# Patient Record
Sex: MALE | Race: BLACK OR AFRICAN AMERICAN | Employment: OTHER | URBAN - METROPOLITAN AREA
[De-identification: names, ages, dates, MRNs, and addresses within clinical notes are randomized per-mention and may not be internally consistent; named-entity substitution may affect disease eponyms.]

---

## 2017-05-03 ENCOUNTER — APPOINTMENT (OUTPATIENT)
Dept: GENERAL RADIOLOGY | Age: 67
End: 2017-05-03
Attending: PHYSICIAN ASSISTANT
Payer: MEDICARE

## 2017-05-03 ENCOUNTER — HOSPITAL ENCOUNTER (OUTPATIENT)
Age: 67
Discharge: HOME | End: 2017-05-03
Payer: MEDICARE

## 2017-05-03 VITALS
RESPIRATION RATE: 20 BRPM | DIASTOLIC BLOOD PRESSURE: 82 MMHG | OXYGEN SATURATION: 97 % | WEIGHT: 205 LBS | HEART RATE: 77 BPM | HEIGHT: 65 IN | TEMPERATURE: 98 F | BODY MASS INDEX: 34.16 KG/M2 | SYSTOLIC BLOOD PRESSURE: 136 MMHG

## 2017-05-03 DIAGNOSIS — M62.838 SPASM OF MUSCLE: ICD-10-CM

## 2017-05-03 DIAGNOSIS — M54.40 BACK PAIN OF LUMBAR REGION WITH SCIATICA: Primary | ICD-10-CM

## 2017-05-03 PROCEDURE — 99214 OFFICE O/P EST MOD 30 MIN: CPT

## 2017-05-03 PROCEDURE — 96372 THER/PROPH/DIAG INJ SC/IM: CPT

## 2017-05-03 PROCEDURE — 81002 URINALYSIS NONAUTO W/O SCOPE: CPT | Performed by: PHYSICIAN ASSISTANT

## 2017-05-03 PROCEDURE — 73502 X-RAY EXAM HIP UNI 2-3 VIEWS: CPT

## 2017-05-03 PROCEDURE — 72110 X-RAY EXAM L-2 SPINE 4/>VWS: CPT

## 2017-05-03 RX ORDER — DEXAMETHASONE SODIUM PHOSPHATE 4 MG/ML
10 VIAL (ML) INJECTION ONCE
Status: COMPLETED | OUTPATIENT
Start: 2017-05-03 | End: 2017-05-03

## 2017-05-03 RX ORDER — TIZANIDINE 4 MG/1
4 TABLET ORAL NIGHTLY
Qty: 30 TABLET | Refills: 0 | Status: SHIPPED | OUTPATIENT
Start: 2017-05-03 | End: 2017-12-23

## 2017-05-03 RX ORDER — KETOROLAC TROMETHAMINE 30 MG/ML
30 INJECTION, SOLUTION INTRAMUSCULAR; INTRAVENOUS ONCE
Status: COMPLETED | OUTPATIENT
Start: 2017-05-03 | End: 2017-05-03

## 2017-05-03 RX ORDER — METHYLPREDNISOLONE 4 MG/1
TABLET ORAL
Qty: 1 PACKAGE | Refills: 0 | Status: ON HOLD | OUTPATIENT
Start: 2017-05-03 | End: 2017-12-22

## 2017-05-03 RX ORDER — DEXAMETHASONE SODIUM PHOSPHATE 4 MG/ML
10 VIAL (ML) INJECTION ONCE
Status: DISCONTINUED | OUTPATIENT
Start: 2017-05-03 | End: 2017-05-03

## 2017-05-03 NOTE — ED INITIAL ASSESSMENT (HPI)
C/O lower back pain for couple months and the past 3 days getting worse. Was seen by PCP and was given muscle relaxant. Denies known injury. Denies urinary symptoms.

## 2017-05-03 NOTE — ED PROVIDER NOTES
Patient Seen in: [address]    History   Patient presents with:  Back Pain    Stated Complaint: back pain x 3 days    HPI    77 yo male here with c/o LBP , L>R for the past several months worse in the past 3 days.  PT reports love Positive for stated complaint: back pain x 3 days  Other systems are as noted in HPI. Constitutional and vital signs reviewed. All other systems reviewed and negative except as noted above.     PSFH elements reviewed from today and agreed except as ot 5/3/2017  PROCEDURE:  XR LUMBAR SPINE (MIN 4 VIEWS) (CPT=72110)  TECHNIQUE:  AP, lateral, oblique, and coned down L5-S1 views were obtained. COMPARISON:  None.   INDICATIONS:  back pain x 3 days  PATIENT STATED HISTORY: (As transcribed by Technologist)  Palomo Henriquez Back pain of lumbar region with sciatica  (primary encounter diagnosis)  Spasm of muscle    Disposition:  Discharge    Follow-up:  [facility]  [address] [facility] [address] [phone]  Schedule an appointment as soon as po

## 2017-12-17 ENCOUNTER — APPOINTMENT (OUTPATIENT)
Dept: CT IMAGING | Facility: HOSPITAL | Age: 67
End: 2017-12-17
Attending: EMERGENCY MEDICINE
Payer: MEDICARE

## 2017-12-17 ENCOUNTER — HOSPITAL ENCOUNTER (EMERGENCY)
Facility: HOSPITAL | Age: 67
Discharge: HOME | End: 2017-12-17
Attending: EMERGENCY MEDICINE
Payer: MEDICARE

## 2017-12-17 VITALS
TEMPERATURE: 98 F | RESPIRATION RATE: 17 BRPM | HEART RATE: 86 BPM | BODY MASS INDEX: 32.49 KG/M2 | WEIGHT: 195 LBS | SYSTOLIC BLOOD PRESSURE: 141 MMHG | OXYGEN SATURATION: 97 % | DIASTOLIC BLOOD PRESSURE: 86 MMHG | HEIGHT: 65 IN

## 2017-12-17 DIAGNOSIS — N20.0 KIDNEY STONE: Primary | ICD-10-CM

## 2017-12-17 PROCEDURE — 99285 EMERGENCY DEPT VISIT HI MDM: CPT

## 2017-12-17 PROCEDURE — 96376 TX/PRO/DX INJ SAME DRUG ADON: CPT

## 2017-12-17 PROCEDURE — 81003 URINALYSIS AUTO W/O SCOPE: CPT | Performed by: EMERGENCY MEDICINE

## 2017-12-17 PROCEDURE — 96374 THER/PROPH/DIAG INJ IV PUSH: CPT

## 2017-12-17 PROCEDURE — 85025 COMPLETE CBC W/AUTO DIFF WBC: CPT | Performed by: EMERGENCY MEDICINE

## 2017-12-17 PROCEDURE — 74176 CT ABD & PELVIS W/O CONTRAST: CPT | Performed by: EMERGENCY MEDICINE

## 2017-12-17 PROCEDURE — 96361 HYDRATE IV INFUSION ADD-ON: CPT

## 2017-12-17 PROCEDURE — 96375 TX/PRO/DX INJ NEW DRUG ADDON: CPT

## 2017-12-17 PROCEDURE — 99284 EMERGENCY DEPT VISIT MOD MDM: CPT

## 2017-12-17 PROCEDURE — 80048 BASIC METABOLIC PNL TOTAL CA: CPT | Performed by: EMERGENCY MEDICINE

## 2017-12-17 RX ORDER — KETOROLAC TROMETHAMINE 30 MG/ML
INJECTION, SOLUTION INTRAMUSCULAR; INTRAVENOUS
Status: DISCONTINUED
Start: 2017-12-17 | End: 2017-12-17

## 2017-12-17 RX ORDER — ONDANSETRON 8 MG/1
8 TABLET, ORALLY DISINTEGRATING ORAL EVERY 4 HOURS PRN
Qty: 10 TABLET | Refills: 0 | Status: ON HOLD | OUTPATIENT
Start: 2017-12-17 | End: 2017-12-22

## 2017-12-17 RX ORDER — HYDROCODONE BITARTRATE AND ACETAMINOPHEN 5; 325 MG/1; MG/1
1-2 TABLET ORAL EVERY 4 HOURS PRN
Qty: 20 TABLET | Refills: 0 | Status: SHIPPED | OUTPATIENT
Start: 2017-12-17 | End: 2017-12-23

## 2017-12-17 RX ORDER — KETOROLAC TROMETHAMINE 30 MG/ML
30 INJECTION, SOLUTION INTRAMUSCULAR; INTRAVENOUS ONCE
Status: COMPLETED | OUTPATIENT
Start: 2017-12-17 | End: 2017-12-17

## 2017-12-17 RX ORDER — HYDROMORPHONE HYDROCHLORIDE 1 MG/ML
0.5 INJECTION, SOLUTION INTRAMUSCULAR; INTRAVENOUS; SUBCUTANEOUS ONCE
Status: COMPLETED | OUTPATIENT
Start: 2017-12-17 | End: 2017-12-17

## 2017-12-17 RX ORDER — HYDROMORPHONE HYDROCHLORIDE 1 MG/ML
1 INJECTION, SOLUTION INTRAMUSCULAR; INTRAVENOUS; SUBCUTANEOUS ONCE
Status: COMPLETED | OUTPATIENT
Start: 2017-12-17 | End: 2017-12-17

## 2017-12-17 RX ORDER — ONDANSETRON 2 MG/ML
4 INJECTION INTRAMUSCULAR; INTRAVENOUS ONCE
Status: COMPLETED | OUTPATIENT
Start: 2017-12-17 | End: 2017-12-17

## 2017-12-17 RX ORDER — IBUPROFEN 600 MG/1
600 TABLET ORAL EVERY 8 HOURS PRN
Qty: 30 TABLET | Refills: 0 | Status: SHIPPED | OUTPATIENT
Start: 2017-12-17 | End: 2017-12-23

## 2017-12-17 NOTE — ED PROVIDER NOTES
Patient Seen in: [facility] Emergency Department    History   Patient presents with:  Abdomen/Flank Pain (GI/)    Stated Complaint:     HPI    52-year-old male who developed flank pain at about 3 AM last night. He has never had this before.   He s nonfocal   Skin: no rashes or nodules         ED Course     Labs Reviewed   BASIC METABOLIC PANEL (8) - Abnormal; Notable for the following:        Result Value    Glucose 117 (*)     Creatinine 1.65 (*)     GFR 49 (*)     All other components within allen days          Medications Prescribed:  Discharge Medication List as of 12/17/2017 10:34 AM    START taking these medications    HYDROcodone-acetaminophen 5-325 MG Oral Tab  Take 1-2 tablets by mouth every 4 (four) hours as needed for Pain., Print Script, D

## 2017-12-22 ENCOUNTER — SURGERY (OUTPATIENT)
Age: 67
End: 2017-12-22

## 2017-12-22 ENCOUNTER — APPOINTMENT (OUTPATIENT)
Dept: GENERAL RADIOLOGY | Facility: HOSPITAL | Age: 67
DRG: 418 | End: 2017-12-22
Attending: COLON & RECTAL SURGERY
Payer: MEDICARE

## 2017-12-22 ENCOUNTER — APPOINTMENT (OUTPATIENT)
Dept: CT IMAGING | Facility: HOSPITAL | Age: 67
DRG: 418 | End: 2017-12-22
Attending: EMERGENCY MEDICINE
Payer: MEDICARE

## 2017-12-22 ENCOUNTER — HOSPITAL ENCOUNTER (INPATIENT)
Facility: HOSPITAL | Age: 67
LOS: 1 days | Discharge: HOME | DRG: 418 | End: 2017-12-23
Attending: EMERGENCY MEDICINE | Admitting: HOSPITALIST
Payer: MEDICARE

## 2017-12-22 ENCOUNTER — ANESTHESIA EVENT (OUTPATIENT)
Dept: SURGERY | Facility: HOSPITAL | Age: 67
DRG: 418 | End: 2017-12-22
Payer: MEDICARE

## 2017-12-22 ENCOUNTER — ANESTHESIA (OUTPATIENT)
Dept: SURGERY | Facility: HOSPITAL | Age: 67
DRG: 418 | End: 2017-12-22
Payer: MEDICARE

## 2017-12-22 DIAGNOSIS — K80.10 CHOLECYSTITIS WITH CHOLELITHIASIS: ICD-10-CM

## 2017-12-22 DIAGNOSIS — K81.0 ACUTE CHOLECYSTITIS: Primary | ICD-10-CM

## 2017-12-22 PROCEDURE — 47563 LAPARO CHOLECYSTECTOMY/GRAPH: CPT | Performed by: COLON & RECTAL SURGERY

## 2017-12-22 PROCEDURE — BF10YZZ FLUOROSCOPY OF BILE DUCTS USING OTHER CONTRAST: ICD-10-PCS | Performed by: COLON & RECTAL SURGERY

## 2017-12-22 PROCEDURE — 74177 CT ABD & PELVIS W/CONTRAST: CPT | Performed by: EMERGENCY MEDICINE

## 2017-12-22 PROCEDURE — 99223 1ST HOSP IP/OBS HIGH 75: CPT | Performed by: COLON & RECTAL SURGERY

## 2017-12-22 PROCEDURE — 99223 1ST HOSP IP/OBS HIGH 75: CPT | Performed by: HOSPITALIST

## 2017-12-22 PROCEDURE — 0FT44ZZ RESECTION OF GALLBLADDER, PERCUTANEOUS ENDOSCOPIC APPROACH: ICD-10-PCS | Performed by: COLON & RECTAL SURGERY

## 2017-12-22 PROCEDURE — 76000 FLUOROSCOPY <1 HR PHYS/QHP: CPT | Performed by: COLON & RECTAL SURGERY

## 2017-12-22 RX ORDER — DIPHENHYDRAMINE HYDROCHLORIDE 50 MG/ML
12.5 INJECTION INTRAMUSCULAR; INTRAVENOUS AS NEEDED
Status: DISCONTINUED | OUTPATIENT
Start: 2017-12-22 | End: 2017-12-22 | Stop reason: HOSPADM

## 2017-12-22 RX ORDER — ASPIRIN 81 MG/1
81 TABLET ORAL DAILY
Status: DISCONTINUED | OUTPATIENT
Start: 2017-12-22 | End: 2017-12-22

## 2017-12-22 RX ORDER — ATORVASTATIN CALCIUM 20 MG/1
20 TABLET, FILM COATED ORAL NIGHTLY
Status: DISCONTINUED | OUTPATIENT
Start: 2017-12-22 | End: 2017-12-22

## 2017-12-22 RX ORDER — KETOROLAC TROMETHAMINE 30 MG/ML
15 INJECTION, SOLUTION INTRAMUSCULAR; INTRAVENOUS ONCE
Status: COMPLETED | OUTPATIENT
Start: 2017-12-22 | End: 2017-12-22

## 2017-12-22 RX ORDER — HYDROMORPHONE HYDROCHLORIDE 1 MG/ML
0.5 INJECTION, SOLUTION INTRAMUSCULAR; INTRAVENOUS; SUBCUTANEOUS EVERY 30 MIN PRN
Status: DISCONTINUED | OUTPATIENT
Start: 2017-12-22 | End: 2017-12-23

## 2017-12-22 RX ORDER — ALBUTEROL SULFATE 90 UG/1
2 AEROSOL, METERED RESPIRATORY (INHALATION) EVERY 6 HOURS PRN
Status: DISCONTINUED | OUTPATIENT
Start: 2017-12-22 | End: 2017-12-23

## 2017-12-22 RX ORDER — ATENOLOL 25 MG/1
25 TABLET ORAL DAILY
Status: DISCONTINUED | OUTPATIENT
Start: 2017-12-22 | End: 2017-12-22

## 2017-12-22 RX ORDER — NALTREXONE HYDROCHLORIDE 50 MG/1
50 TABLET, FILM COATED ORAL NIGHTLY
COMMUNITY

## 2017-12-22 RX ORDER — POLYETHYLENE GLYCOL 3350 17 G/17G
17 POWDER, FOR SOLUTION ORAL DAILY PRN
Status: DISCONTINUED | OUTPATIENT
Start: 2017-12-22 | End: 2017-12-23

## 2017-12-22 RX ORDER — ONDANSETRON 2 MG/ML
4 INJECTION INTRAMUSCULAR; INTRAVENOUS ONCE
Status: COMPLETED | OUTPATIENT
Start: 2017-12-22 | End: 2017-12-22

## 2017-12-22 RX ORDER — MORPHINE SULFATE 2 MG/ML
1 INJECTION, SOLUTION INTRAMUSCULAR; INTRAVENOUS EVERY 2 HOUR PRN
Status: DISCONTINUED | OUTPATIENT
Start: 2017-12-22 | End: 2017-12-23

## 2017-12-22 RX ORDER — TRAMADOL HYDROCHLORIDE 50 MG/1
50 TABLET ORAL
Qty: 30 TABLET | Refills: 0 | Status: SHIPPED | OUTPATIENT
Start: 2017-12-22

## 2017-12-22 RX ORDER — ACETAMINOPHEN 10 MG/ML
INJECTION, SOLUTION INTRAVENOUS
Status: DISCONTINUED | OUTPATIENT
Start: 2017-12-22 | End: 2017-12-22 | Stop reason: HOSPADM

## 2017-12-22 RX ORDER — AMLODIPINE BESYLATE 5 MG/1
10 TABLET ORAL DAILY
Status: DISCONTINUED | OUTPATIENT
Start: 2017-12-23 | End: 2017-12-23

## 2017-12-22 RX ORDER — AMLODIPINE BESYLATE 5 MG/1
10 TABLET ORAL DAILY
Status: DISCONTINUED | OUTPATIENT
Start: 2017-12-23 | End: 2017-12-22

## 2017-12-22 RX ORDER — ONDANSETRON 2 MG/ML
4 INJECTION INTRAMUSCULAR; INTRAVENOUS EVERY 6 HOURS PRN
Status: DISCONTINUED | OUTPATIENT
Start: 2017-12-22 | End: 2017-12-23

## 2017-12-22 RX ORDER — MORPHINE SULFATE 4 MG/ML
4 INJECTION, SOLUTION INTRAMUSCULAR; INTRAVENOUS EVERY 2 HOUR PRN
Status: DISCONTINUED | OUTPATIENT
Start: 2017-12-22 | End: 2017-12-23

## 2017-12-22 RX ORDER — BUPIVACAINE HYDROCHLORIDE AND EPINEPHRINE 2.5; 5 MG/ML; UG/ML
INJECTION, SOLUTION EPIDURAL; INFILTRATION; INTRACAUDAL; PERINEURAL AS NEEDED
Status: DISCONTINUED | OUTPATIENT
Start: 2017-12-22 | End: 2017-12-22 | Stop reason: HOSPADM

## 2017-12-22 RX ORDER — TERAZOSIN 10 MG/1
10 CAPSULE ORAL NIGHTLY
COMMUNITY

## 2017-12-22 RX ORDER — SODIUM CHLORIDE 9 MG/ML
INJECTION, SOLUTION INTRAVENOUS CONTINUOUS
Status: DISCONTINUED | OUTPATIENT
Start: 2017-12-22 | End: 2017-12-23

## 2017-12-22 RX ORDER — MEPERIDINE HYDROCHLORIDE 25 MG/ML
12.5 INJECTION INTRAMUSCULAR; INTRAVENOUS; SUBCUTANEOUS AS NEEDED
Status: DISCONTINUED | OUTPATIENT
Start: 2017-12-22 | End: 2017-12-22 | Stop reason: HOSPADM

## 2017-12-22 RX ORDER — TERAZOSIN 10 MG/1
10 CAPSULE ORAL NIGHTLY
Status: DISCONTINUED | OUTPATIENT
Start: 2017-12-22 | End: 2017-12-23

## 2017-12-22 RX ORDER — SODIUM CHLORIDE, SODIUM LACTATE, POTASSIUM CHLORIDE, CALCIUM CHLORIDE 600; 310; 30; 20 MG/100ML; MG/100ML; MG/100ML; MG/100ML
INJECTION, SOLUTION INTRAVENOUS CONTINUOUS
Status: DISCONTINUED | OUTPATIENT
Start: 2017-12-22 | End: 2017-12-22 | Stop reason: HOSPADM

## 2017-12-22 RX ORDER — HYDROMORPHONE HYDROCHLORIDE 1 MG/ML
INJECTION, SOLUTION INTRAMUSCULAR; INTRAVENOUS; SUBCUTANEOUS
Status: COMPLETED
Start: 2017-12-22 | End: 2017-12-22

## 2017-12-22 RX ORDER — CARISOPRODOL 350 MG/1
350 TABLET ORAL 3 TIMES DAILY PRN
COMMUNITY

## 2017-12-22 RX ORDER — HYDROCODONE BITARTRATE AND ACETAMINOPHEN 5; 325 MG/1; MG/1
1 TABLET ORAL AS NEEDED
Status: DISCONTINUED | OUTPATIENT
Start: 2017-12-22 | End: 2017-12-22

## 2017-12-22 RX ORDER — MAGNESIUM HYDROXIDE 1200 MG/15ML
LIQUID ORAL CONTINUOUS PRN
Status: DISCONTINUED | OUTPATIENT
Start: 2017-12-22 | End: 2017-12-22

## 2017-12-22 RX ORDER — TRAMADOL HYDROCHLORIDE 50 MG/1
50 TABLET ORAL EVERY 6 HOURS PRN
Status: DISCONTINUED | OUTPATIENT
Start: 2017-12-22 | End: 2017-12-23

## 2017-12-22 RX ORDER — ONDANSETRON 2 MG/ML
4 INJECTION INTRAMUSCULAR; INTRAVENOUS AS NEEDED
Status: DISCONTINUED | OUTPATIENT
Start: 2017-12-22 | End: 2017-12-22 | Stop reason: HOSPADM

## 2017-12-22 RX ORDER — NALOXONE HYDROCHLORIDE 0.4 MG/ML
80 INJECTION, SOLUTION INTRAMUSCULAR; INTRAVENOUS; SUBCUTANEOUS AS NEEDED
Status: DISCONTINUED | OUTPATIENT
Start: 2017-12-22 | End: 2017-12-22 | Stop reason: HOSPADM

## 2017-12-22 RX ORDER — OXYCODONE HYDROCHLORIDE 5 MG/1
10 TABLET ORAL ONCE AS NEEDED
Status: DISCONTINUED | OUTPATIENT
Start: 2017-12-22 | End: 2017-12-22 | Stop reason: HOSPADM

## 2017-12-22 RX ORDER — TIZANIDINE 4 MG/1
4 TABLET ORAL NIGHTLY
Status: DISCONTINUED | OUTPATIENT
Start: 2017-12-22 | End: 2017-12-23

## 2017-12-22 RX ORDER — HYDROMORPHONE HYDROCHLORIDE 1 MG/ML
0.4 INJECTION, SOLUTION INTRAMUSCULAR; INTRAVENOUS; SUBCUTANEOUS EVERY 5 MIN PRN
Status: DISCONTINUED | OUTPATIENT
Start: 2017-12-22 | End: 2017-12-22 | Stop reason: HOSPADM

## 2017-12-22 RX ORDER — HYDROCHLOROTHIAZIDE 12.5 MG/1
12.5 CAPSULE, GELATIN COATED ORAL DAILY
Status: DISCONTINUED | OUTPATIENT
Start: 2017-12-22 | End: 2017-12-22

## 2017-12-22 RX ORDER — SODIUM PHOSPHATE, DIBASIC AND SODIUM PHOSPHATE, MONOBASIC 7; 19 G/133ML; G/133ML
1 ENEMA RECTAL ONCE AS NEEDED
Status: DISCONTINUED | OUTPATIENT
Start: 2017-12-22 | End: 2017-12-23

## 2017-12-22 RX ORDER — MORPHINE SULFATE 4 MG/ML
2 INJECTION, SOLUTION INTRAMUSCULAR; INTRAVENOUS EVERY 2 HOUR PRN
Status: DISCONTINUED | OUTPATIENT
Start: 2017-12-22 | End: 2017-12-23

## 2017-12-22 RX ORDER — HYDROCODONE BITARTRATE AND ACETAMINOPHEN 5; 325 MG/1; MG/1
2 TABLET ORAL AS NEEDED
Status: DISCONTINUED | OUTPATIENT
Start: 2017-12-22 | End: 2017-12-22

## 2017-12-22 RX ORDER — MORPHINE SULFATE 2 MG/ML
2 INJECTION, SOLUTION INTRAMUSCULAR; INTRAVENOUS EVERY 2 HOUR PRN
Status: DISCONTINUED | OUTPATIENT
Start: 2017-12-22 | End: 2017-12-23

## 2017-12-22 RX ORDER — BISACODYL 10 MG
10 SUPPOSITORY, RECTAL RECTAL
Status: DISCONTINUED | OUTPATIENT
Start: 2017-12-22 | End: 2017-12-23

## 2017-12-22 RX ORDER — HEPARIN SODIUM 5000 [USP'U]/ML
5000 INJECTION, SOLUTION INTRAVENOUS; SUBCUTANEOUS EVERY 8 HOURS SCHEDULED
Status: DISCONTINUED | OUTPATIENT
Start: 2017-12-22 | End: 2017-12-23

## 2017-12-22 RX ORDER — DOCUSATE SODIUM 100 MG/1
100 CAPSULE, LIQUID FILLED ORAL 2 TIMES DAILY
Status: DISCONTINUED | OUTPATIENT
Start: 2017-12-22 | End: 2017-12-22

## 2017-12-22 RX ORDER — MIDAZOLAM HYDROCHLORIDE 1 MG/ML
1 INJECTION INTRAMUSCULAR; INTRAVENOUS EVERY 5 MIN PRN
Status: DISCONTINUED | OUTPATIENT
Start: 2017-12-22 | End: 2017-12-22 | Stop reason: HOSPADM

## 2017-12-22 RX ORDER — MORPHINE SULFATE 4 MG/ML
1 INJECTION, SOLUTION INTRAMUSCULAR; INTRAVENOUS EVERY 2 HOUR PRN
Status: DISCONTINUED | OUTPATIENT
Start: 2017-12-22 | End: 2017-12-23

## 2017-12-22 NOTE — ED INITIAL ASSESSMENT (HPI)
C/o R flank pain, states he was seen here for same sxs on Sunday, dx. Kidney Stones.  Pt taking [place] with no relief, (+) nausea, no vomiting

## 2017-12-22 NOTE — CONSULTS
[facility]  Report of Consultation    Sinan Rosario Patient Status:  Inpatient    5/3/1950 MRN [id number]   [facility] 3NE-A Attending Army Brady MD   Hosp Day # 0 PCP Jose L Page MD, Yuriy Gamboa MD     Reason for Consultation:  Rafat Anne Hyperlipidemia    • Kidney stones      Past Surgical History:  No date: COLONOSCOPY      Comment: h/o polyps benign  No date: OTHER      Comment: lung reduction right lung 1995 Susan Todd  No family history on file.    reports that he quit smoking about Review of patient's allergies complete, up to date. Cardiovascular:  Negative for cool extremity and irregular heartbeat/palpitations  Constitutional:  Negative for fever.  Negative for decreased activity, irritability and lethargy  Endocrine:  Negative fo Lab  12/22/17   0431   RBC  4.39   HGB  13.9   HCT  38.8   MCV  88.4   MCH  31.7   MCHC  35.8   RDW  11.9   NEPRELIM  4.69   WBC  6.5   PLT  208.0       Recent Labs   Lab  12/22/17   0431   GLU  132*   BUN  17   CREATSERUM  1.41*   CA  8.9   ALB  3.9   N evidence of acute diverticulitis. The appendix is normal.  ABDOMINAL WALL:  Fat containing umbilical hernia is noted. URINARY BLADDER:  No visible focal wall thickening, lesion, or calculus. PELVIC NODES:  No adenopathy.     PELVIC ORGANS:  No visible Genny  12/22/2017  9:42 AM    I, Dr. Eli Cast, personally performed the services described in this documentation, as scribed by Esequiel La, in my presence, and it is both accurate and complete.

## 2017-12-22 NOTE — ED PROVIDER NOTES
Patient Seen in: [facility] Emergency Department    History   Patient presents with:  Abdomen/Flank Pain (GI/)    Stated Complaint: known kidney stone    HPI    Sharp constant right upper quadrant pain radiating to the right flank associated nause He exhibits no distension. There is tenderness. Musculoskeletal: Normal range of motion. He exhibits no tenderness. Neurological: He is alert and oriented to person, place, and time. He exhibits normal muscle tone.  Coordination normal.   Skin: Skin is Bosniak category 2F. With recommend followup in 6 months for further evaluation.       No evidence of obstructing ureteral calculus on contrast enhanced exam. Please note calculus may be obscured after the administration of iodinated contrast.  No evidence 12/22/2017 Unknown

## 2017-12-22 NOTE — H&P
[facility]  History and Physical     Thalia Bosworth Patient Status:  Emergency    5/3/1950 MRN [id number]   Location [address] [facility] Attending Claudette Slice, MD   Hosp Day # 0 PCP Alisa Bernal MD, Samra Marin MD     Chief Complaint mouth every 4 (four) hours as needed for Nausea. Disp: 10 tablet Rfl: 0   TiZANidine HCl 4 MG Oral Tab Take 1 tablet (4 mg total) by mouth nightly.  Disp: 30 tablet Rfl: 0   methylPREDNISolone 4 MG Oral Tablet Therapy Pack Dose bowen take as directed Disp: 1 Recent Labs   Lab  12/22/17   0431   GLU  132*   BUN  17   CREATSERUM  1.41*   CA  8.9   ALB  3.9   NA  136   K  3.8   CL  100*   CO2  28.0   ALKPHO  79   AST  18   ALT  30   BILT  0.7   TP  8.3       Estimated Creatinine Clearance: 44.8 mL/min (base

## 2017-12-22 NOTE — PLAN OF CARE
Pt left the floor via bed to OR. Alert and oriented, reports minimal to no pain, denies nausea. VS stable.

## 2017-12-22 NOTE — ANESTHESIA PREPROCEDURE EVALUATION
PRE-OP EVALUATION    Patient Name: Eliceo Stone    Pre-op Diagnosis: Cholecystitis with cholelithiasis [K80.10]    Procedure(s):  Laparoscopic Cholecystectomy with Intraoperative Cholangiogram    Surgeon(s) and Role:     * Wood Lauren MD - ADD-vantage 3.375 g Intravenous Q8H   Terazosin HCl (HYTRIN) cap 10 mg 10 mg Oral Nightly   [START ON 12/23/2017] AmLODIPine Besylate (NORVASC) tab 10 mg 10 mg Oral Daily       Outpatient Medications:    Terazosin HCl 10 MG Oral Cap Take 10 mg by mouth nig 12/22/2017   RBC 4.39 12/22/2017   HGB 13.9 12/22/2017   HCT 38.8 12/22/2017   MCV 88.4 12/22/2017   MCH 31.7 12/22/2017   MCHC 35.8 12/22/2017   RDW 11.9 12/22/2017   .0 12/22/2017       Lab Results  Component Value Date    12/22/2017   K 3.8

## 2017-12-23 VITALS
TEMPERATURE: 98 F | SYSTOLIC BLOOD PRESSURE: 129 MMHG | BODY MASS INDEX: 32.32 KG/M2 | DIASTOLIC BLOOD PRESSURE: 74 MMHG | HEART RATE: 74 BPM | OXYGEN SATURATION: 96 % | HEIGHT: 65 IN | RESPIRATION RATE: 13 BRPM | WEIGHT: 194 LBS

## 2017-12-23 PROCEDURE — 99239 HOSP IP/OBS DSCHRG MGMT >30: CPT | Performed by: HOSPITALIST

## 2017-12-23 NOTE — ANESTHESIA POSTPROCEDURE EVALUATION
[address] [place] [address] Patient Status:  Inpatient   Age/Gender 78year old male MRN [id number]   [facility] SURGERY Attending Betzaida Shaffer MD   Hosp Day # 0 PCP Whit Lassiter MD, Sunny Mitchell MD       Anesthesia Post-op Note    Procedure(s)

## 2017-12-23 NOTE — DISCHARGE SUMMARY
[facility] [place] [facility]  DISCHARGE SUMMARY     Christopher Fishman Patient Status:  Inpatient    5/3/1950 MRN [id number]   [facility] 3NE-A Attending Melissa Chaudhary MD   Hosp Day # 1 PCP Maggie Finnegan MD, Jovani Spain MD     Date of Admission: 2017  Date resolved  6.  R ptosis, prior retinal detachment, multiple ocular surgeries   7. 12mm L renal cyst     Discharge Medication List:     Discharge Medications      START taking these medications      Instructions Prescription details   TraMADol HCl 50 MG Tabs 75215  [phone]    Schedule an appointment as soon as possible for a visit in 1 week  For post op follow up, You may see a PA (Alejandro Moreno or Violeta)      Vital signs:  Temp:  [97.9 °F (36.6 °C)-99 °F (37.2 °C)] 98 °F (36.7 °C)  Pulse:  [69-80] 74

## 2017-12-23 NOTE — PROGRESS NOTES
[facility]  Progress Note    Kaitlynn Steele Patient Status:  Inpatient    5/3/1950 MRN [id number]   [facility] 3NE-A Attending Radha Dyson MD   Hosp Day # 1 PCP Vilma Mireles MD, Ismael Beal MD     Subjective: Tolerating diet.  Non nausea or

## 2017-12-24 NOTE — PLAN OF CARE
Pt discharged home via wheelchair. Prescriptions and discharge instructions given with pt verbalizing understanding.

## 2018-10-10 ENCOUNTER — APPOINTMENT (OUTPATIENT)
Dept: GENERAL RADIOLOGY | Age: 68
End: 2018-10-10
Attending: NURSE PRACTITIONER
Payer: MEDICARE

## 2018-10-10 ENCOUNTER — HOSPITAL ENCOUNTER (OUTPATIENT)
Age: 68
Discharge: HOME | End: 2018-10-10
Payer: MEDICARE

## 2018-10-10 VITALS
OXYGEN SATURATION: 96 % | WEIGHT: 194 LBS | DIASTOLIC BLOOD PRESSURE: 70 MMHG | SYSTOLIC BLOOD PRESSURE: 138 MMHG | HEART RATE: 70 BPM | RESPIRATION RATE: 18 BRPM | HEIGHT: 65 IN | BODY MASS INDEX: 32.32 KG/M2 | TEMPERATURE: 98 F

## 2018-10-10 DIAGNOSIS — R07.81 RIB PAIN: Primary | ICD-10-CM

## 2018-10-10 PROCEDURE — 99213 OFFICE O/P EST LOW 20 MIN: CPT

## 2018-10-10 PROCEDURE — 99214 OFFICE O/P EST MOD 30 MIN: CPT

## 2018-10-10 PROCEDURE — 71111 X-RAY EXAM RIBS/CHEST4/> VWS: CPT | Performed by: NURSE PRACTITIONER

## 2018-10-10 RX ORDER — NAPROXEN 500 MG/1
500 TABLET ORAL 2 TIMES DAILY PRN
Qty: 20 TABLET | Refills: 0 | Status: SHIPPED | OUTPATIENT
Start: 2018-10-10 | End: 2018-10-17

## 2018-10-10 NOTE — ED INITIAL ASSESSMENT (HPI)
Pt here c/o back and bilateral rib pain for last 3 weeks. States about 4 weeks ago he was put in a harness at [facility] and states one week later started having the pain.    States he was placed back in the harness yesterday and pain is worse t

## 2018-10-10 NOTE — ED PROVIDER NOTES
Patient Seen in: [facility]    History   Patient presents with:  Back Pain (musculoskeletal)    Stated Complaint: RIB AREA PAIN    HPI    45-year-old male who comes in with complaint of bilateral rib pain started approximately 3 complaint: RIB AREA PAIN  Other systems are as noted in HPI. Constitutional and vital signs reviewed. All other systems reviewed and negative except as noted above.     Physical Exam     ED Triage Vitals [10/10/18 1542]   /79   Pulse 71   Resp 1 lung base described in 2016 presumed postsurgical.  There are postsurgical changes in the lungs. BONES:  Normal for age. No rib fracture or lesion. SOFT TISSUES:  Negative. .    CONCLUSION:  Negative for fracture.      Dictated by: Ahmet Nj MD on 10/

## 2020-03-14 ENCOUNTER — APPOINTMENT (OUTPATIENT)
Dept: CT IMAGING | Age: 70
End: 2020-03-14
Attending: PHYSICIAN ASSISTANT
Payer: MEDICARE

## 2020-03-14 ENCOUNTER — APPOINTMENT (OUTPATIENT)
Dept: GENERAL RADIOLOGY | Age: 70
End: 2020-03-14
Attending: PHYSICIAN ASSISTANT
Payer: MEDICARE

## 2020-03-14 ENCOUNTER — HOSPITAL ENCOUNTER (OUTPATIENT)
Age: 70
Discharge: HOME | End: 2020-03-14
Payer: MEDICARE

## 2020-03-14 VITALS
HEIGHT: 65 IN | BODY MASS INDEX: 32.49 KG/M2 | DIASTOLIC BLOOD PRESSURE: 86 MMHG | OXYGEN SATURATION: 96 % | HEART RATE: 84 BPM | RESPIRATION RATE: 16 BRPM | TEMPERATURE: 99 F | SYSTOLIC BLOOD PRESSURE: 125 MMHG | WEIGHT: 195 LBS

## 2020-03-14 DIAGNOSIS — S39.011A ABDOMINAL WALL STRAIN, INITIAL ENCOUNTER: Primary | ICD-10-CM

## 2020-03-14 LAB
#MXD IC: 0.5 X10ˆ3/UL (ref 0.1–1)
CREAT BLD-MCNC: 1.5 MG/DL (ref 0.7–1.3)
HCT VFR BLD AUTO: 40.2 % (ref 39–53)
HGB BLD-MCNC: 13.8 G/DL (ref 13–17.5)
LYMPHOCYTES # BLD AUTO: 1.1 X10ˆ3/UL (ref 1–4)
LYMPHOCYTES NFR BLD AUTO: 26.5 %
MCH RBC QN AUTO: 31.7 PG (ref 26–34)
MCHC RBC AUTO-ENTMCNC: 34.3 G/DL (ref 31–37)
MCV RBC AUTO: 92.2 FL (ref 80–100)
MIXED CELL %: 12.2 %
NEUTROPHILS # BLD AUTO: 2.7 X10ˆ3/UL (ref 1.5–7.7)
NEUTROPHILS NFR BLD AUTO: 61.3 %
PLATELET # BLD AUTO: 272 X10ˆ3/UL (ref 150–450)
POCT BILIRUBIN URINE: NEGATIVE
POCT BLOOD URINE: NEGATIVE
POCT GLUCOSE URINE: NEGATIVE MG/DL
POCT KETONE URINE: NEGATIVE MG/DL
POCT LEUKOCYTE ESTERASE URINE: NEGATIVE
POCT NITRITE URINE: NEGATIVE
POCT PH URINE: 6.5 (ref 5–8)
POCT PROTEIN URINE: NEGATIVE MG/DL
POCT SPECIFIC GRAVITY URINE: 1.01
POCT URINE CLARITY: CLEAR
POCT URINE COLOR: YELLOW
POCT UROBILINOGEN URINE: 0.2 MG/DL
RBC # BLD AUTO: 4.36 X10ˆ6/UL (ref 3.8–5.8)
WBC # BLD AUTO: 4.3 X10ˆ3/UL (ref 4–11)

## 2020-03-14 PROCEDURE — 81002 URINALYSIS NONAUTO W/O SCOPE: CPT | Performed by: PHYSICIAN ASSISTANT

## 2020-03-14 PROCEDURE — 74177 CT ABD & PELVIS W/CONTRAST: CPT | Performed by: PHYSICIAN ASSISTANT

## 2020-03-14 PROCEDURE — 96360 HYDRATION IV INFUSION INIT: CPT

## 2020-03-14 PROCEDURE — 99214 OFFICE O/P EST MOD 30 MIN: CPT

## 2020-03-14 PROCEDURE — 82565 ASSAY OF CREATININE: CPT

## 2020-03-14 PROCEDURE — 71046 X-RAY EXAM CHEST 2 VIEWS: CPT | Performed by: PHYSICIAN ASSISTANT

## 2020-03-14 PROCEDURE — 85025 COMPLETE CBC W/AUTO DIFF WBC: CPT | Performed by: PHYSICIAN ASSISTANT

## 2020-03-14 RX ORDER — CYCLOBENZAPRINE HCL 10 MG
10 TABLET ORAL 3 TIMES DAILY PRN
Qty: 20 TABLET | Refills: 0 | Status: SHIPPED | OUTPATIENT
Start: 2020-03-14 | End: 2020-03-21

## 2020-03-14 RX ORDER — SODIUM CHLORIDE 9 MG/ML
1000 INJECTION, SOLUTION INTRAVENOUS ONCE
Status: COMPLETED | OUTPATIENT
Start: 2020-03-14 | End: 2020-03-14

## 2020-03-14 NOTE — ED PROVIDER NOTES
Patient Seen in: [facility]      History   Patient presents with:  Pain  Abdominal Pain    Stated Complaint: RIGHT QUADRANT PAIN    HPI  Ioana Figueroa is a 61-year-old male with a known history of emphysema, hypertension, hyperlipidem Exam  Vitals signs and nursing note reviewed. Constitutional:       General: He is not in acute distress. Appearance: He is well-developed. He is not diaphoretic. HENT:      Head: Normocephalic and atraumatic.       Right Ear: External ear normal. (CPT=71046)  INDICATIONS:  RIGHT QUADRANT PAIN  COMPARISON:  None. TECHNIQUE:  PA and lateral chest radiographs were obtained.   PATIENT STATED HISTORY: (As transcribed by Technologist)  The patient has pain to the right side of his chest.    FINDINGS:  LUCERO BOWEL/MESENTERY:  Normal caliber small and large bowel including the appendix. Scattered diverticula. ABDOMINAL WALL:  Small umbilical and bilateral inguinal fat containing hernias. URINARY BLADDER:  Not well distended limiting evaluation.   PELVIC NODES 0          I have given the patient instructions regarding his diagnosis, expectations, follow up, and return to the ER precautions.   I explained to the patient that emergent conditions may arise to return to the immediate care or ER for new, worsening or

## 2020-03-14 NOTE — ED INITIAL ASSESSMENT (HPI)
Presents with complaints of a aching constant discomfort to the right rib area/ right upper abdominal region. Pain increases with movements and inspiration. Denies fever, nausea, vomiting, or diarrhea. Denies shortness of breath or recent travel.

## 2024-02-18 ENCOUNTER — V-VISIT (OUTPATIENT)
Dept: URGENT CARE | Age: 74
End: 2024-02-18

## 2024-02-18 VITALS
WEIGHT: 189.82 LBS | BODY MASS INDEX: 31.63 KG/M2 | RESPIRATION RATE: 18 BRPM | TEMPERATURE: 97.2 F | HEIGHT: 65 IN | SYSTOLIC BLOOD PRESSURE: 120 MMHG | DIASTOLIC BLOOD PRESSURE: 82 MMHG | HEART RATE: 75 BPM | OXYGEN SATURATION: 97 %

## 2024-02-18 DIAGNOSIS — H60.501 ACUTE OTITIS EXTERNA OF RIGHT EAR, UNSPECIFIED TYPE: Primary | ICD-10-CM

## 2024-02-18 PROCEDURE — 99203 OFFICE O/P NEW LOW 30 MIN: CPT | Performed by: NURSE PRACTITIONER

## 2024-02-18 RX ORDER — CARVEDILOL 6.25 MG/1
TABLET ORAL
COMMUNITY
Start: 2024-01-22

## 2024-02-18 RX ORDER — TERAZOSIN 10 MG/1
10 CAPSULE ORAL NIGHTLY
COMMUNITY

## 2024-02-18 RX ORDER — AMLODIPINE AND VALSARTAN 10; 320 MG/1; MG/1
1 TABLET ORAL DAILY
COMMUNITY
Start: 2023-09-20

## 2024-02-18 RX ORDER — NEOMYCIN SULFATE, POLYMYXIN B SULFATE AND HYDROCORTISONE 10; 3.5; 1 MG/ML; MG/ML; [USP'U]/ML
SUSPENSION/ DROPS AURICULAR (OTIC)
Qty: 10 ML | Refills: 0 | Status: SHIPPED | OUTPATIENT
Start: 2024-02-18

## 2024-02-18 RX ORDER — ATORVASTATIN CALCIUM 20 MG/1
20 TABLET, FILM COATED ORAL NIGHTLY
COMMUNITY

## 2024-02-18 RX ORDER — BUPROPION HYDROCHLORIDE 300 MG/1
300 TABLET ORAL DAILY
COMMUNITY
Start: 2023-10-13

## 2024-02-18 ASSESSMENT — PAIN SCALES - GENERAL: PAINLEVEL: 7

## 2024-02-18 ASSESSMENT — ENCOUNTER SYMPTOMS
GASTROINTESTINAL NEGATIVE: 1
CONSTITUTIONAL NEGATIVE: 1
EYES NEGATIVE: 1
RESPIRATORY NEGATIVE: 1

## (undated) DEVICE — TRAY FOLEY 16F IC URINMETER

## (undated) DEVICE — SUTURE MONOCRYL 4-0 PS-2

## (undated) DEVICE — Device

## (undated) DEVICE — STERILE POLYISOPRENE POWDER-FREE SURGICAL GLOVES: Brand: PROTEXIS

## (undated) DEVICE — TROCAR: Brand: KII SHIELDED BLADED ACCESS SYSTEM

## (undated) DEVICE — TROCAR: Brand: KII® SLEEVE

## (undated) DEVICE — DRAPE C-ARM UNIVERSAL

## (undated) DEVICE — LAP CHOLE/APPY CDS-LF: Brand: MEDLINE INDUSTRIES, INC.

## (undated) DEVICE — TISSUE RETRIEVAL SYSTEM: Brand: INZII RETRIEVAL SYSTEM

## (undated) DEVICE — SOL  .9 1000ML BTL

## (undated) DEVICE — MONOFILAMENT ABSORBABLE SUTURE: Brand: MAXON

## (undated) DEVICE — LIGAMAX 5 MM ENDOSCOPIC MULTIPLE CLIP APPLIER: Brand: LIGAMAX

## (undated) DEVICE — NITINOL WIRE STR 035

## (undated) DEVICE — CHLORAPREP 26ML APPLICATOR

## (undated) DEVICE — OPEN-END FLEXI-TIP URETERAL CATHETER: Brand: FLEXI-TIP

## (undated) DEVICE — KENDALL SCD EXPRESS SLEEVES, KNEE LENGTH, MEDIUM: Brand: KENDALL SCD

## (undated) NOTE — LETTER
[facility]  Jd Manisharandy 62 [address] [facility], [address] [place] [address]    Consent for Operation    Date: __________________    Time: _______________    1.  I authorize the performance upon [facility] the following operation:    Procedure(s):  Laparoscopic Demi procedure has been videotaped, the surgeon will obtain the original videotape. The hospital will not be responsible for storage or maintenance of this tape.     6. For the purpose of advancing medical education, I consent to the admittance of observers to t STATEMENTS REQUIRING INSERTION OR COMPLETION WERE FILLED IN.     Signature of Patient:   ___________________________    When the patient is a minor or mentally incompetent to give consent:  Signature of person authorized to consent for patient: ____________ supplements, and pills I can buy without a prescription (including street drugs/illegal medications). Failure to inform my anesthesiologist about these medicines may increase my risk of anesthetic complications.   · If I am allergic to anything or have had Anesthesiologist Signature     Date   Time  I have discussed the procedure and information above with the patient (or patient’s representative) and answered their questions. The patient or their representative has agreed to have anesthesia services.     ___

## (undated) NOTE — ED AVS SNAPSHOT
Gurinder Jones   MRN: [id number]    Department:  [facility] Emergency Department   Date of Visit:  12/17/2017           Disclosure     Insurance plans vary and the physician(s) referred by the ER may not be covered by your plan.  Please contact tell this physician (or your personal doctor if your instructions are to return to your personal doctor) about any new or lasting problems. The primary care or specialist physician will see patients referred from the [facility] Emergency Department.  Carol Pickett

## (undated) NOTE — ED AVS SNAPSHOT
[facility] in [address] [place] [address] Drive,[address]    [address] [place] [address]    Phone:  [phone]    Fax:  [phone]   MRN: [id number]    Department:  [facility] in [facility] [place]   Date of Visit:  5/3/2017 These medications were sent to [address], [address] [place] [address] Jessica, [phone], [address], [place] [phone]     Phone:  [phone] - methylprednisolone 4 You were examined and treated today on an urgent basis only. This was not a substitute for ongoing medical care. Often, one Immediate Care visit does not uncover every injury or illness.  If you have been referred to a primary care or a specialist physicia [address] ([address]) [address]  Vitaliy Peer ([facility]) [phone] [address] ([address]) [phone]                Additional Information       We are concerned for your o Disc space narrowing L2-3 and L3-4.            Dictated by: Davida Feliciano MD on 5/03/2017 at 12:30       Approved by: Davida Feliciano MD              Narrative:    PROCEDURE:  XR LUMBAR SPINE (MIN 4 VIEWS) (CPT=72110)     TECHNIQUE:  AP, lateral, oblique, and